# Patient Record
Sex: FEMALE | Race: BLACK OR AFRICAN AMERICAN | NOT HISPANIC OR LATINO | Employment: STUDENT | URBAN - METROPOLITAN AREA
[De-identification: names, ages, dates, MRNs, and addresses within clinical notes are randomized per-mention and may not be internally consistent; named-entity substitution may affect disease eponyms.]

---
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
## 2025-01-13 RX ORDER — OSELTAMIVIR PHOSPHATE 75 MG/1
75 CAPSULE ORAL 2 TIMES DAILY
Qty: 10 CAPSULE | Refills: 0 | Status: SHIPPED | OUTPATIENT
Start: 2025-01-13

## 2025-01-16 ENCOUNTER — APPOINTMENT (OUTPATIENT)
Dept: ULTRASOUND IMAGING | Facility: HOSPITAL | Age: 20
End: 2025-01-16
Payer: COMMERCIAL

## 2025-01-16 ENCOUNTER — HOSPITAL ENCOUNTER (EMERGENCY)
Facility: HOSPITAL | Age: 20
Discharge: HOME | End: 2025-01-16
Attending: EMERGENCY MEDICINE
Payer: COMMERCIAL

## 2025-01-16 VITALS
WEIGHT: 150.35 LBS | OXYGEN SATURATION: 98 % | BODY MASS INDEX: 24.16 KG/M2 | DIASTOLIC BLOOD PRESSURE: 76 MMHG | TEMPERATURE: 98.3 F | SYSTOLIC BLOOD PRESSURE: 131 MMHG | RESPIRATION RATE: 18 BRPM | HEIGHT: 66 IN | HEART RATE: 68 BPM

## 2025-01-16 DIAGNOSIS — R10.13 INTERMITTENT EPIGASTRIC ABDOMINAL PAIN: Primary | ICD-10-CM

## 2025-01-16 DIAGNOSIS — R74.8 ELEVATED LIVER ENZYMES: ICD-10-CM

## 2025-01-16 LAB
ALBUMIN SERPL-MCNC: 4.7 G/DL (ref 3.5–5.2)
ALBUMIN/GLOB SERPL: 1.3 G/DL
ALP SERPL-CCNC: 58 U/L (ref 39–117)
ALT SERPL W P-5'-P-CCNC: 94 U/L (ref 1–33)
ANION GAP SERPL CALCULATED.3IONS-SCNC: 12.1 MMOL/L (ref 5–15)
AST SERPL-CCNC: 114 U/L (ref 1–32)
BACTERIA UR QL AUTO: ABNORMAL /HPF
BASOPHILS # BLD AUTO: 0.01 10*3/MM3 (ref 0–0.2)
BASOPHILS NFR BLD AUTO: 0.2 % (ref 0–1.5)
BILIRUB SERPL-MCNC: 0.3 MG/DL (ref 0–1.2)
BILIRUB UR QL STRIP: ABNORMAL
BUN SERPL-MCNC: 7 MG/DL (ref 6–20)
BUN/CREAT SERPL: 8.6 (ref 7–25)
CALCIUM SPEC-SCNC: 9.5 MG/DL (ref 8.6–10.5)
CHLORIDE SERPL-SCNC: 101 MMOL/L (ref 98–107)
CLARITY UR: CLEAR
CO2 SERPL-SCNC: 26.9 MMOL/L (ref 22–29)
COLOR UR: YELLOW
CREAT SERPL-MCNC: 0.81 MG/DL (ref 0.57–1)
DEPRECATED RDW RBC AUTO: 39.5 FL (ref 37–54)
EGFRCR SERPLBLD CKD-EPI 2021: 107.4 ML/MIN/1.73
EOSINOPHIL # BLD AUTO: 0.01 10*3/MM3 (ref 0–0.4)
EOSINOPHIL NFR BLD AUTO: 0.2 % (ref 0.3–6.2)
ERYTHROCYTE [DISTWIDTH] IN BLOOD BY AUTOMATED COUNT: 13.2 % (ref 12.3–15.4)
GLOBULIN UR ELPH-MCNC: 3.7 GM/DL
GLUCOSE SERPL-MCNC: 91 MG/DL (ref 65–99)
GLUCOSE UR STRIP-MCNC: NEGATIVE MG/DL
HCG INTACT+B SERPL-ACNC: <0.5 MIU/ML
HCT VFR BLD AUTO: 49.3 % (ref 34–46.6)
HGB BLD-MCNC: 15.6 G/DL (ref 12–15.9)
HGB UR QL STRIP.AUTO: NEGATIVE
HOLD SPECIMEN: NORMAL
HOLD SPECIMEN: NORMAL
HYALINE CASTS UR QL AUTO: ABNORMAL /LPF
IMM GRANULOCYTES # BLD AUTO: 0.01 10*3/MM3 (ref 0–0.05)
IMM GRANULOCYTES NFR BLD AUTO: 0.2 % (ref 0–0.5)
KETONES UR QL STRIP: ABNORMAL
LEUKOCYTE ESTERASE UR QL STRIP.AUTO: NEGATIVE
LIPASE SERPL-CCNC: 35 U/L (ref 13–60)
LYMPHOCYTES # BLD AUTO: 1.29 10*3/MM3 (ref 0.7–3.1)
LYMPHOCYTES NFR BLD AUTO: 28.6 % (ref 19.6–45.3)
MCH RBC QN AUTO: 26.1 PG (ref 26.6–33)
MCHC RBC AUTO-ENTMCNC: 31.6 G/DL (ref 31.5–35.7)
MCV RBC AUTO: 82.4 FL (ref 79–97)
MONOCYTES # BLD AUTO: 0.7 10*3/MM3 (ref 0.1–0.9)
MONOCYTES NFR BLD AUTO: 15.5 % (ref 5–12)
MUCOUS THREADS URNS QL MICRO: ABNORMAL /HPF
NEUTROPHILS NFR BLD AUTO: 2.49 10*3/MM3 (ref 1.7–7)
NEUTROPHILS NFR BLD AUTO: 55.3 % (ref 42.7–76)
NITRITE UR QL STRIP: NEGATIVE
NRBC BLD AUTO-RTO: 0 /100 WBC (ref 0–0.2)
PH UR STRIP.AUTO: 6.5 [PH] (ref 5–8)
PLATELET # BLD AUTO: 203 10*3/MM3 (ref 140–450)
PMV BLD AUTO: 12.5 FL (ref 6–12)
POTASSIUM SERPL-SCNC: 3.6 MMOL/L (ref 3.5–5.2)
PROT SERPL-MCNC: 8.4 G/DL (ref 6–8.5)
PROT UR QL STRIP: ABNORMAL
RBC # BLD AUTO: 5.98 10*6/MM3 (ref 3.77–5.28)
RBC # UR STRIP: ABNORMAL /HPF
REF LAB TEST METHOD: ABNORMAL
SODIUM SERPL-SCNC: 140 MMOL/L (ref 136–145)
SP GR UR STRIP: 1.02 (ref 1–1.03)
SQUAMOUS #/AREA URNS HPF: ABNORMAL /HPF
UROBILINOGEN UR QL STRIP: ABNORMAL
WBC # UR STRIP: ABNORMAL /HPF
WBC NRBC COR # BLD AUTO: 4.51 10*3/MM3 (ref 3.4–10.8)
WHOLE BLOOD HOLD COAG: NORMAL
WHOLE BLOOD HOLD SPECIMEN: NORMAL

## 2025-01-16 PROCEDURE — 81001 URINALYSIS AUTO W/SCOPE: CPT | Performed by: EMERGENCY MEDICINE

## 2025-01-16 PROCEDURE — 84702 CHORIONIC GONADOTROPIN TEST: CPT | Performed by: EMERGENCY MEDICINE

## 2025-01-16 PROCEDURE — 83690 ASSAY OF LIPASE: CPT | Performed by: EMERGENCY MEDICINE

## 2025-01-16 PROCEDURE — 99284 EMERGENCY DEPT VISIT MOD MDM: CPT

## 2025-01-16 PROCEDURE — 80053 COMPREHEN METABOLIC PANEL: CPT | Performed by: EMERGENCY MEDICINE

## 2025-01-16 PROCEDURE — 76705 ECHO EXAM OF ABDOMEN: CPT

## 2025-01-16 PROCEDURE — 85025 COMPLETE CBC W/AUTO DIFF WBC: CPT | Performed by: EMERGENCY MEDICINE

## 2025-01-16 RX ORDER — DICYCLOMINE HCL 20 MG
20 TABLET ORAL EVERY 6 HOURS
Qty: 20 TABLET | Refills: 0 | Status: SHIPPED | OUTPATIENT
Start: 2025-01-16

## 2025-01-16 RX ORDER — PANTOPRAZOLE SODIUM 20 MG/1
40 TABLET, DELAYED RELEASE ORAL DAILY
Qty: 30 TABLET | Refills: 0 | Status: SHIPPED | OUTPATIENT
Start: 2025-01-16

## 2025-01-16 RX ORDER — SODIUM CHLORIDE 0.9 % (FLUSH) 0.9 %
10 SYRINGE (ML) INJECTION AS NEEDED
Status: DISCONTINUED | OUTPATIENT
Start: 2025-01-16 | End: 2025-01-16 | Stop reason: HOSPADM

## 2025-01-16 NOTE — ED PROVIDER NOTES
Time: 10:41 AM EST  Date of encounter:  1/16/2025  Independent Historian/Clinical History and Information was obtained by:   Patient    History is limited by: N/A    Chief Complaint: Abdominal pain      History of Present Illness:  Patient is a 19 y.o. year old female who presents to the emergency department for evaluation of abdominal pain.  Patient presents the emergency department today for evaluation of epigastric abdominal pain that has been intermittently going on since 12-.  She has associated nausea and vomiting.  She does state that she has had some loose stools but no significant diarrhea.  She denies fever, dysuria, urinary frequency.  Patient denies any previous abdominal surgeries.  She did state that she had an increase of taking NSAIDs once her pain started and was not taking this medication with food however she has quit taking this.  Patient does not drink alcohol.  Patient states that her symptoms seem to be worse after eating food.      Patient Care Team  Primary Care Provider: Ismael Lai DO    Past Medical History:     No Known Allergies  History reviewed. No pertinent past medical history.  History reviewed. No pertinent surgical history.  History reviewed. No pertinent family history.    Home Medications:  Prior to Admission medications    Medication Sig Start Date End Date Taking? Authorizing Provider   oseltamivir (Tamiflu) 75 MG capsule Take 1 capsule by mouth 2 (Two) Times a Day. 1/13/25  Yes Ismael Lai DO   norethindrone-ethinyl estradiol-ferrous fumarate (LOESTIN 24 FE) 1-20 MG-MCG(24) per tablet Take 1 tablet by mouth Daily for 90 days. 7/1/24 9/29/24  Ismael Lai DO        Social History:   Social History     Tobacco Use    Smoking status: Never     Passive exposure: Never    Smokeless tobacco: Never   Vaping Use    Vaping status: Never Used   Substance Use Topics    Alcohol use: Never    Drug use: Never         Review of Systems:  Review  "of Systems   Constitutional:  Negative for fever.   Respiratory:  Negative for shortness of breath.    Cardiovascular:  Negative for chest pain.   Gastrointestinal:  Positive for abdominal pain, nausea and vomiting. Negative for diarrhea.   Genitourinary:  Negative for dysuria and frequency.   Musculoskeletal:  Negative for back pain.        Physical Exam:  /76 (BP Location: Left arm, Patient Position: Sitting)   Pulse 68   Temp 98.3 °F (36.8 °C) (Oral)   Resp 18   Ht 167.6 cm (66\")   Wt 68.2 kg (150 lb 5.7 oz)   LMP 12/24/2024 (Exact Date)   SpO2 98%   BMI 24.27 kg/m²     Physical Exam  Vitals and nursing note reviewed.   Constitutional:       General: She is not in acute distress.     Appearance: Normal appearance. She is well-developed and normal weight. She is not ill-appearing, toxic-appearing or diaphoretic.   HENT:      Head: Normocephalic and atraumatic.      Nose: Nose normal.   Eyes:      Extraocular Movements: Extraocular movements intact.      Conjunctiva/sclera: Conjunctivae normal.      Pupils: Pupils are equal, round, and reactive to light.   Cardiovascular:      Rate and Rhythm: Normal rate and regular rhythm.      Heart sounds: Normal heart sounds.   Pulmonary:      Effort: Pulmonary effort is normal.      Breath sounds: Normal breath sounds.   Abdominal:      General: Abdomen is flat. Bowel sounds are normal.      Palpations: Abdomen is soft.      Tenderness: There is abdominal tenderness in the epigastric area. There is no guarding. Negative signs include Samuels's sign, Rovsing's sign and McBurney's sign.      Hernia: No hernia is present.   Musculoskeletal:         General: Normal range of motion.      Cervical back: Normal range of motion and neck supple.   Skin:     General: Skin is warm and dry.   Neurological:      General: No focal deficit present.      Mental Status: She is alert and oriented to person, place, and time.   Psychiatric:         Mood and Affect: Mood normal.    "      Behavior: Behavior normal.         Thought Content: Thought content normal.         Judgment: Judgment normal.                  Medical Decision Making:    Comorbidities that affect care:    None    External Notes reviewed:    None      The following orders were placed and all results were independently analyzed by me:  Orders Placed This Encounter   Procedures    US Gallbladder    [place] Draw    Comprehensive Metabolic Panel    Lipase    Urinalysis With Microscopic If Indicated (No Culture) - Urine, Clean Catch    hCG, Quantitative, Pregnancy    CBC Auto Differential    Urinalysis, Microscopic Only - Urine, Clean Catch    NPO Diet NPO Type: Strict NPO    Undress & Gown    Insert Peripheral IV    CBC & Differential    Green Top (Gel)    Lavender Top    Gold Top - SST    Light Blue Top       Medications Given in the Emergency Department:  Medications   sodium chloride 0.9 % flush 10 mL (has no administration in time range)        ED Course:    ED Course as of 01/16/25 1157   Thu Jan 16, 2025   1055 Patient currently denies any abdominal pain [MD]      ED Course User Index  [MD] Kaden Laird PA-C       Labs:    Lab Results (last 24 hours)       Procedure Component Value Units Date/Time    CBC & Differential [[id number]]  (Abnormal) Collected: 01/16/25 1027    Specimen: Blood Updated: 01/16/25 1037    Narrative:      The following orders were created for panel order CBC & Differential.  Procedure                               Abnormality         Status                     ---------                               -----------         ------                     CBC Auto Differential[[id number]]        Abnormal            Final result                 Please view results for these tests on the individual orders.    Comprehensive Metabolic Panel [[id number]]  (Abnormal) Collected: 01/16/25 1027    Specimen: Blood Updated: 01/16/25 1058     Glucose 91 mg/dL      BUN 7 mg/dL      Creatinine 0.81 mg/dL      Sodium 140  mmol/L      Potassium 3.6 mmol/L      Chloride 101 mmol/L      CO2 26.9 mmol/L      Calcium 9.5 mg/dL      Total Protein 8.4 g/dL      Albumin 4.7 g/dL      ALT (SGPT) 94 U/L      AST (SGOT) 114 U/L      Alkaline Phosphatase 58 U/L      Total Bilirubin 0.3 mg/dL      Globulin 3.7 gm/dL      A/G Ratio 1.3 g/dL      BUN/Creatinine Ratio 8.6     Anion Gap 12.1 mmol/L      eGFR 107.4 mL/min/1.73     Narrative:      GFR Categories in Chronic Kidney Disease (CKD)      GFR Category          GFR (mL/min/1.73)    Interpretation  G1                     90 or greater         Normal or high (1)  G2                      60-89                Mild decrease (1)  G3a                   45-59                Mild to moderate decrease  G3b                   30-44                Moderate to severe decrease  G4                    15-29                Severe decrease  G5                    14 or less           Kidney failure          (1)In the absence of evidence of kidney disease, neither GFR category G1 or G2 fulfill the criteria for CKD.    eGFR calculation 2021 CKD-EPI creatinine equation, which does not include race as a factor    Lipase [[id number]]  (Normal) Collected: 01/16/25 1027    Specimen: Blood Updated: 01/16/25 1058     Lipase 35 U/L     Urinalysis With Microscopic If Indicated (No Culture) - Urine, Clean Catch [[id number]]  (Abnormal) Collected: 01/16/25 1027    Specimen: Urine, Clean Catch Updated: 01/16/25 1043     Color, UA Yellow     Appearance, UA Clear     pH, UA 6.5     Specific Gravity, UA 1.020     Glucose, UA Negative     Ketones, UA Trace     Bilirubin, UA Small (1+)     Blood, UA Negative     Protein, UA 30 mg/dL (1+)     Leuk Esterase, UA Negative     Nitrite, UA Negative     Urobilinogen, UA 0.2 E.U./dL    hCG, Quantitative, Pregnancy [[id number]] Collected: 01/16/25 1027    Specimen: Blood Updated: 01/16/25 1056     HCG Quantitative <0.50 mIU/mL     Narrative:      HCG Ranges by Gestational Age    Females -  non-pregnant premenopausal   </= 1mIU/mL HCG  Females - postmenopausal               </= 7mIU/mL HCG    3 Weeks       5.4   -      72 mIU/mL  4 Weeks      10.2   -     708 mIU/mL  5 Weeks       217   -   8,245 mIU/mL  6 Weeks       152   -  32,177 mIU/mL  7 Weeks     4,059   - 153,767 mIU/mL  8 Weeks    31,366   - 149,094 mIU/mL  9 Weeks    59,109   - 135,901 mIU/mL  10 Weeks   44,186   - 170,409 mIU/mL  12 Weeks   27,107   - 201,615 mIU/mL  14 Weeks   24,302   -  93,646 mIU/mL  15 Weeks   12,540   -  69,747 mIU/mL  16 Weeks    8,904   -  55,332 mIU/mL  17 Weeks    8,240   -  51,793 mIU/mL  18 Weeks    9,649   -  55,271 mIU/mL      CBC Auto Differential [[id number]]  (Abnormal) Collected: 01/16/25 1027    Specimen: Blood Updated: 01/16/25 1037     WBC 4.51 10*3/mm3      RBC 5.98 10*6/mm3      Hemoglobin 15.6 g/dL      Hematocrit 49.3 %      MCV 82.4 fL      MCH 26.1 pg      MCHC 31.6 g/dL      RDW 13.2 %      RDW-SD 39.5 fl      MPV 12.5 fL      Platelets 203 10*3/mm3      Neutrophil % 55.3 %      Lymphocyte % 28.6 %      Monocyte % 15.5 %      Eosinophil % 0.2 %      Basophil % 0.2 %      Immature Grans % 0.2 %      Neutrophils, Absolute 2.49 10*3/mm3      Lymphocytes, Absolute 1.29 10*3/mm3      Monocytes, Absolute 0.70 10*3/mm3      Eosinophils, Absolute 0.01 10*3/mm3      Basophils, Absolute 0.01 10*3/mm3      Immature Grans, Absolute 0.01 10*3/mm3      nRBC 0.0 /100 WBC     Urinalysis, Microscopic Only - Urine, Clean Catch [[id number]]  (Abnormal) Collected: 01/16/25 1027    Specimen: Urine, Clean Catch Updated: 01/16/25 1050     RBC, UA None Seen /HPF      WBC, UA 3-5 /HPF      Bacteria, UA 1+ /HPF      Squamous Epithelial Cells, UA 0-2 /HPF      Hyaline Casts, UA 0-2 /LPF      Mucus, UA Moderate/2+ /HPF      Methodology Automated Microscopy             Imaging:    US Gallbladder    Result Date: 1/16/2025  US GALLBLADDER Date of Exam: 1/16/2025 11:01 AM EST Indication: Intermittent upper abdominal pain.  Comparison: No comparisons available. Technique: Grayscale and color Doppler ultrasound evaluation of the right upper quadrant was performed. Findings: Visualized pancreas unremarkable Liver demonstrates normal echogenicity and texture. No intrahepatic biliary duct dilatation. Hepatopetal flow in the main portal vein. Patent interrogated hepatic veins Gallbladder normal in size and wall thickness. No stones demonstrated. No pericholecystic or fluid. Common duct normal at 3 mm Right kidney demonstrates normal echogenicity with no hydronephrosis     Impression: Unremarkable right upper quadrant ultrasound Electronically Signed: Giuseppe Scanlon  1/16/2025 11:49 AM EST  Workstation ID: [id number]       Differential Diagnosis and Discussion:    Abdominal Pain: Based on the patient's signs and symptoms, I considered abdominal aortic aneurysm, small bowel obstruction, pancreatitis, acute cholecystitis, acute appendecitis, peptic ulcer disease, gastritis, colitis, endocrine disorders, irritable bowel syndrome and other differential diagnosis an etiology of the patient's abdominal pain.    PROCEDURES:    Labs were collected in the emergency department and all labs were reviewed and interpreted by me.  CT scan was performed in the emergency department and the CT scan radiology impression was interpreted by me.    No orders to display       Procedures    MDM  Number of Diagnoses or Management Options  Elevated liver enzymes  Intermittent epigastric abdominal pain  Diagnosis management comments: Patient presented to the emergency department today for evaluation of intermittent abdominal pain that is been ongoing for approximately 1 months.  CBC notes normal white blood cell count with hemoglobin of 15.6 and hematocrit 49.3.  CMP notes elevated ALT and AST which patient does not have history of.  hCG negative.  Lipase normal.  Urinalysis not significant for acute UTI.  Gallbladder ultrasound was completed and unremarkable, liver  also had unremarkable findings.  Will begin patient on Protonix and Bentyl at home and have her follow-up with PCP for reevaluation of labs.  Instructed patient if she continues to have issues she will need to follow-up with gastroenterology for EGD.  Return to the emergency department guidelines provided.       Amount and/or Complexity of Data Reviewed  Clinical lab tests: reviewed and ordered  Tests in the radiology section of CPT®: reviewed and ordered  Decide to obtain previous medical records or to obtain history from someone other than the patient: yes    Risk of Complications, Morbidity, and/or Mortality  Presenting problems: moderate  Diagnostic procedures: low  Management options: low    Patient Progress  Patient progress: stable         Patient Care Considerations:    CT ABDOMEN AND PELVIS: I considered ordering a CT scan of the abdomen and pelvis however patient has no current abdominal pain and symptoms are intermittent to the epigastric area      Consultants/Shared Management Plan:    None    Social Determinants of Health:    Patient is independent, reliable, and has access to care.       Disposition and Care Coordination:    Discharged: The patient is suitable and stable for discharge with no need for consideration of admission.    I have explained the patient´s condition, diagnoses and treatment plan based on the information available to me at this time. I have answered questions and addressed any concerns. The patient has a good  understanding of the patient´s diagnosis, condition, and treatment plan as can be expected at this point. The vital signs have been stable. The patient´s condition is stable and appropriate for discharge from the emergency department.      The patient will pursue further outpatient evaluation with the primary care physician or other designated or consulting physician as outlined in the discharge instructions. They are agreeable to this plan of care and follow-up instructions  have been explained in detail. The patient has received these instructions in written format and has expressed an understanding of the discharge instructions. The patient is aware that any significant change in condition or worsening of symptoms should prompt an immediate return to this or the closest emergency department or call to 911.  I have explained discharge medications and the need for follow up with the patient/caretakers. This was also printed in the discharge instructions. Patient was discharged with the following medications and follow up:      Medication List        New Prescriptions      dicyclomine 20 MG tablet  Commonly known as: BENTYL  Take 1 tablet by mouth Every 6 (Six) Hours.     pantoprazole 20 MG EC tablet  Commonly known as: PROTONIX  Take 2 tablets by mouth Daily.               Where to Get Your Medications        These medications were sent to [facility] [id number] - [place] - [address] AT [facility] [address] - [phone]  - [phone]   [address] [place] [address]      Phone: [phone]   dicyclomine 20 MG tablet  pantoprazole 20 MG EC tablet      Ismael Lai, DO  [address] [facility] [place] [address]  [phone]    Schedule an appointment as soon as possible for a visit          Final diagnoses:   Intermittent epigastric abdominal pain   Elevated liver enzymes        ED Disposition       ED Disposition   Discharge    Condition   Stable    Comment   --               This medical record created using voice recognition software.             Kaden Laird PA-C  01/16/25 [id number]

## 2025-01-16 NOTE — DISCHARGE INSTRUCTIONS
Your labs and ultrasound completed in the emergency department today look well other than your liver enzymes are mildly elevated.  Take Protonix daily.  Take Bentyl as needed for abdominal pain and cramping.  Schedule follow-up appointment with your primary care provider to have your liver enzymes reevaluated.  If you continue to have issues after beginning these medications you may need to see gastroenterologist to have an EGD completed.  Return to the emergency department for new or worsening symptoms.

## 2025-02-03 NOTE — PROGRESS NOTES
Chief Complaint  Annual Exam    Subjective          Diana Wade presents to [facility]  History of Present Illness    Previous PCP: Dr. Lai  Specialist(s): Petra Shetty (GYN)  COVID vaccine: Last dose 12/2021    History of Present Illness  The patient presents for evaluation of depression, anger issues, and HSV-2.    She reports a persistent state of unhappiness, characterized by frequent feelings of sadness outweighing moments of edward. She is uncertain if this constitutes depression. Her sleep pattern is normal, neither excessive nor insufficient. She has been experiencing these symptoms for an extended period but has not sought professional help until now. She occasionally experiences anxiety in public settings. Despite these challenges, she maintains her employment at [employer], although with occasional difficulties. She has no history of chronic medical conditions and is currently on birth control. She has never been on any medications for her symptoms. She attempted therapy through her employer's health program approximately 1 to 2 years ago but found the experience unsatisfactory due to a perceived lack of competence in the therapist. She is open to exploring medication as a potential treatment option.    She also reports a tendency towards irritability and anger, particularly in her work environment, which she manages without any specific coping strategies. Her family has observed this pattern of behavior for several years. She did not have any trouble in school when she was in school.    She was diagnosed with an STD last summer and requires medication for the same. She reported the incident to the police, but they were unable to take action due to lack of evidence as she waited for a week to report it. She tested positive for chlamydia in 07/2024 and was treated for it. 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 She has not had  "sexual contact with anyone since the incident and is not currently experiencing an outbreak.    She needs a refill on her birth control.    SOCIAL HISTORY  She works at [employer].    PHQ-2 Depression Screening  Little interest or pleasure in doing things? Several days   Feeling down, depressed, or hopeless? Over half   PHQ-2 Total Score 3         Current Outpatient Medications   Medication Instructions    norethindrone-ethinyl estradiol-ferrous fumarate (LOESTIN 24 FE) 1-20 MG-MCG(24) per tablet 1 tablet, Oral, Daily    sertraline (Zoloft) 50 MG tablet Take 1/2 tab PO QHS x 5 days then increase to 1 tab PO QHS    valACYclovir (VALTREX) 1,000 mg, Oral, 2 Times Daily       The following portions of the patient's history were reviewed and updated as appropriate: allergies, current medications, past family history, past medical history, past social history, past surgical history, and problem list.    Objective   Vital Signs:   /82 (BP Location: Left arm, Patient Position: Sitting, Cuff Size: Adult)   Pulse 110   Temp 98.2 °F (36.8 °C) (Temporal)   Ht 167.6 cm (66\")   Wt 68.9 kg (152 lb)   SpO2 96%   BMI 24.53 kg/m²     BP Readings from Last 3 Encounters:   02/06/25 118/82   01/31/25 133/84   01/16/25 131/76     Wt Readings from Last 3 Encounters:   02/06/25 68.9 kg (152 lb) (82%, Z= 0.90)*   01/31/25 68 kg (150 lb) (80%, Z= 0.84)*   01/16/25 68.2 kg (150 lb 5.7 oz) (80%, Z= 0.86)*     * Growth percentiles are based on CDC (Girls, 2-20 Years) data.     Pediatric BMI = 76 %ile (Z= 0.70) based on CDC (Girls, 2-20 Years) BMI-for-age based on BMI available on 2/6/2025.. BMI is within normal parameters. No other follow-up for BMI required.     Physical Exam     Appearance: No acute distress, well-nourished  Head: normocephalic, atraumatic  Eyes: extraocular movements intact, no scleral icterus, no conjunctival injection  Ears, Nose, and Throat: external ears normal, nares patent, moist mucous " membranes  Cardiovascular: regular rate and rhythm. no murmurs, rubs, or gallops. no edema  Respiratory: breathing comfortably, symmetric chest rise, clear to auscultation bilaterally. No wheezes, rales, or rhonchi.  Neuro: alert and oriented to time, place, and person. Normal gait  Psych: normal mood and affect     Physical Exam        Result Review :   The following data was reviewed by: TONY Tello on 02/06/2025:  Common labs          5/25/2024    15:13 1/16/2025    10:27 2/6/2025    11:49   Common Labs   Glucose 106  91  73    BUN 10  7  8    Creatinine 0.70  0.81  0.64    Sodium 139  140  141    Potassium 3.8  3.6  4.2    Chloride 103  101  107    Calcium 9.8  9.5  9.7    Albumin 4.4  4.7  4.2    Total Bilirubin 0.5  0.3  0.3    Alkaline Phosphatase 54  58  51    AST (SGOT) 20  114  27    ALT (SGPT) 11  94  19    WBC 8.59  4.51  6.40    Hemoglobin 13.6  15.6  13.1    Hematocrit 41.8  49.3  40.2    Platelets 270  203  322    Total Cholesterol   170    Triglycerides   44    HDL Cholesterol   57    LDL Cholesterol    104        Results  Laboratory Studies  Positive for chlamydia in July 2024. Positive for HSV-2.         Lab Results   Component Value Date    SARSANTIGEN Not Detected 05/04/2022    FLUAAG Not Detected 05/04/2022    FLUBAG Not Detected 05/04/2022    RAPSCRN Negative 05/04/2022    POCPREGUR Negative 02/06/2025    BILIRUBINUR Small (1+) (A) 01/16/2025            Assessment and Plan    Diagnoses and all orders for this visit:    1. Annual physical exam (Primary)  -     TSH Rfx On Abnormal To Free T4  -     Lipid Panel  -     Comprehensive Metabolic Panel  -     CBC & Differential    2. Screening for thyroid disorder  -     TSH Rfx On Abnormal To Free T4    3. Screening for lipid disorders  -     Lipid Panel    4. Need for hepatitis C screening test  -     HCV Antibody Rfx To Qnt PCR  -     Hepatitis C Virus Interpretation    5. OCP (oral contraceptive pills) initiation  Comments:  Start  pill today and take 2 today then one daily.  Orders:  -     norethindrone-ethinyl estradiol-ferrous fumarate (LOESTIN 24 FE) 1-20 MG-MCG(24) per tablet; Take 1 tablet by mouth Daily for 90 days.  Dispense: 90 tablet; Refill: 1  -     POCT pregnancy, urine    6. Current mild episode of major depressive disorder without prior episode  -     sertraline (Zoloft) 50 MG tablet; Take 1/2 tab PO QHS x 5 days then increase to 1 tab PO QHS  Dispense: 30 tablet; Refill: 1  -     Ambulatory Referral to Psychology    7. Outbursts of anger  -     sertraline (Zoloft) 50 MG tablet; Take 1/2 tab PO QHS x 5 days then increase to 1 tab PO QHS  Dispense: 30 tablet; Refill: 1  -     Ambulatory Referral to Psychology    8. STD exposure  -     RPR  -     HSV 1 and 2-Specific Ab, IgG  -     HIV-1/O/2 ANTIGEN/ANTIBODY, 4TH GENERATION  -     Hepatitis panel, acute  -     Chlamydia trachomatis, Neisseria gonorrhoeae, Trichomonas vaginalis, PCR - Swab, Urine, Random Void    9. History of PCR DNA positive for HSV2  -     valACYclovir (Valtrex) 1000 MG tablet; Take 1 tablet by mouth 2 (Two) Times a Day for 7 days.  Dispense: 14 tablet; Refill: 0      Initiate zoloft     Initiate valtrex    Assessment & Plan  1. Depression.  She reports feeling more sad than happy most days and has been dealing with these symptoms for a while. Zoloft (sertraline) will be initiated at a dosage of 50 mg, with half a tablet to be taken nightly for the first 5 days, followed by a full tablet thereafter. Potential side effects, including mild sedation, have been discussed. It may take up to 4 weeks to reach its maximum effect. A referral to a behavioral health clinic has been made for further evaluation and counseling. If any side effects occur, she is advised to contact the office before the month is up.    2. Anger issues.  She experiences issues with getting angry very quickly, especially at work. Zoloft (sertraline) is expected to help manage these symptoms as  well. The importance of finding a suitable therapist has been emphasized, and a referral to a behavioral health clinic has been made.    3. HSV-2.  She tested positive for HSV-2 and has had an outbreak. A prescription for antiviral medication has been provided, with instructions to take it for 7 days during an outbreak. If another outbreak occurs, she should notify the office so the dosage can be adjusted. A comprehensive STD panel will be conducted today, including tests for hepatitis, HIV, syphilis, gonorrhea, and chlamydia.    4. Medication management.  A urine pregnancy test will be performed today to facilitate the renewal of her birth control prescription.    5. Health maintenance.  Blood work will be conducted today to assess cholesterol, kidney, liver, and thyroid function.    Follow-up  The patient will follow up in 4 weeks.    Medications Discontinued During This Encounter   Medication Reason    norethindrone-ethinyl estradiol-ferrous fumarate (LOESTIN 24 FE) 1-20 MG-MCG(24) per tablet Reorder          Follow Up   Return in about 4 weeks (around 3/6/2025) for Anxiety, Depression.  Patient was given instructions and counseling regarding her condition or for health maintenance advice. Please see specific information pulled into the AVS if appropriate.       TONY Tello  02/14/25  13:04 EST      Patient or patient representative verbalized consent for the use of Ambient Listening during the visit with  TONY Tello for chart documentation. 2/14/2025  13:04 EST

## 2025-02-06 ENCOUNTER — OFFICE VISIT (OUTPATIENT)
Dept: INTERNAL MEDICINE | Facility: CLINIC | Age: 20
End: 2025-02-06
Payer: COMMERCIAL

## 2025-02-06 VITALS
HEART RATE: 110 BPM | SYSTOLIC BLOOD PRESSURE: 118 MMHG | TEMPERATURE: 98.2 F | BODY MASS INDEX: 24.43 KG/M2 | OXYGEN SATURATION: 96 % | WEIGHT: 152 LBS | DIASTOLIC BLOOD PRESSURE: 82 MMHG | HEIGHT: 66 IN

## 2025-02-06 DIAGNOSIS — Z13.29 SCREENING FOR THYROID DISORDER: ICD-10-CM

## 2025-02-06 DIAGNOSIS — Z13.220 SCREENING FOR LIPID DISORDERS: ICD-10-CM

## 2025-02-06 DIAGNOSIS — Z20.2 STD EXPOSURE: ICD-10-CM

## 2025-02-06 DIAGNOSIS — F32.0 CURRENT MILD EPISODE OF MAJOR DEPRESSIVE DISORDER WITHOUT PRIOR EPISODE: ICD-10-CM

## 2025-02-06 DIAGNOSIS — Z86.19 HISTORY OF PCR DNA POSITIVE FOR HSV2: ICD-10-CM

## 2025-02-06 DIAGNOSIS — Z30.011 OCP (ORAL CONTRACEPTIVE PILLS) INITIATION: ICD-10-CM

## 2025-02-06 DIAGNOSIS — Z00.00 ANNUAL PHYSICAL EXAM: Primary | ICD-10-CM

## 2025-02-06 DIAGNOSIS — R45.4 OUTBURSTS OF ANGER: ICD-10-CM

## 2025-02-06 DIAGNOSIS — Z11.59 NEED FOR HEPATITIS C SCREENING TEST: ICD-10-CM

## 2025-02-06 LAB
ALBUMIN SERPL-MCNC: 4.2 G/DL (ref 3.5–5.2)
ALBUMIN/GLOB SERPL: 1.3 G/DL
ALP SERPL-CCNC: 51 U/L (ref 39–117)
ALT SERPL W P-5'-P-CCNC: 19 U/L (ref 1–33)
ANION GAP SERPL CALCULATED.3IONS-SCNC: 10.6 MMOL/L (ref 5–15)
AST SERPL-CCNC: 27 U/L (ref 1–32)
B-HCG UR QL: NEGATIVE
BASOPHILS # BLD AUTO: 0.04 10*3/MM3 (ref 0–0.2)
BASOPHILS NFR BLD AUTO: 0.6 % (ref 0–1.5)
BILIRUB SERPL-MCNC: 0.3 MG/DL (ref 0–1.2)
BUN SERPL-MCNC: 8 MG/DL (ref 6–20)
BUN/CREAT SERPL: 12.5 (ref 7–25)
CALCIUM SPEC-SCNC: 9.7 MG/DL (ref 8.6–10.5)
CHLORIDE SERPL-SCNC: 107 MMOL/L (ref 98–107)
CHOLEST SERPL-MCNC: 170 MG/DL (ref 0–200)
CO2 SERPL-SCNC: 23.4 MMOL/L (ref 22–29)
CREAT SERPL-MCNC: 0.64 MG/DL (ref 0.57–1)
DEPRECATED RDW RBC AUTO: 38.1 FL (ref 37–54)
EGFRCR SERPLBLD CKD-EPI 2021: 130.7 ML/MIN/1.73
EOSINOPHIL # BLD AUTO: 0.04 10*3/MM3 (ref 0–0.4)
EOSINOPHIL NFR BLD AUTO: 0.6 % (ref 0.3–6.2)
ERYTHROCYTE [DISTWIDTH] IN BLOOD BY AUTOMATED COUNT: 12.7 % (ref 12.3–15.4)
EXPIRATION DATE: NORMAL
GLOBULIN UR ELPH-MCNC: 3.2 GM/DL
GLUCOSE SERPL-MCNC: 73 MG/DL (ref 65–99)
HAV IGM SERPL QL IA: NORMAL
HBV CORE IGM SERPL QL IA: NORMAL
HBV SURFACE AG SERPL QL IA: NORMAL
HCT VFR BLD AUTO: 40.2 % (ref 34–46.6)
HCV AB SER QL: NORMAL
HDLC SERPL-MCNC: 57 MG/DL (ref 40–60)
HGB BLD-MCNC: 13.1 G/DL (ref 12–15.9)
HIV 1+2 AB+HIV1 P24 AG SERPL QL IA: NORMAL
IMM GRANULOCYTES # BLD AUTO: 0.01 10*3/MM3 (ref 0–0.05)
IMM GRANULOCYTES NFR BLD AUTO: 0.2 % (ref 0–0.5)
INTERNAL NEGATIVE CONTROL: NORMAL
INTERNAL POSITIVE CONTROL: NORMAL
LDLC SERPL CALC-MCNC: 104 MG/DL (ref 0–100)
LDLC/HDLC SERPL: 1.83 {RATIO}
LYMPHOCYTES # BLD AUTO: 1.39 10*3/MM3 (ref 0.7–3.1)
LYMPHOCYTES NFR BLD AUTO: 21.7 % (ref 19.6–45.3)
Lab: NORMAL
MCH RBC QN AUTO: 27.2 PG (ref 26.6–33)
MCHC RBC AUTO-ENTMCNC: 32.6 G/DL (ref 31.5–35.7)
MCV RBC AUTO: 83.4 FL (ref 79–97)
MONOCYTES # BLD AUTO: 0.58 10*3/MM3 (ref 0.1–0.9)
MONOCYTES NFR BLD AUTO: 9.1 % (ref 5–12)
NEUTROPHILS NFR BLD AUTO: 4.34 10*3/MM3 (ref 1.7–7)
NEUTROPHILS NFR BLD AUTO: 67.8 % (ref 42.7–76)
NRBC BLD AUTO-RTO: 0 /100 WBC (ref 0–0.2)
PLATELET # BLD AUTO: 322 10*3/MM3 (ref 140–450)
PMV BLD AUTO: 12.4 FL (ref 6–12)
POTASSIUM SERPL-SCNC: 4.2 MMOL/L (ref 3.5–5.2)
PROT SERPL-MCNC: 7.4 G/DL (ref 6–8.5)
RBC # BLD AUTO: 4.82 10*6/MM3 (ref 3.77–5.28)
SODIUM SERPL-SCNC: 141 MMOL/L (ref 136–145)
TRIGL SERPL-MCNC: 44 MG/DL (ref 0–150)
TSH SERPL DL<=0.05 MIU/L-ACNC: 0.85 UIU/ML (ref 0.27–4.2)
VLDLC SERPL-MCNC: 9 MG/DL (ref 5–40)
WBC NRBC COR # BLD AUTO: 6.4 10*3/MM3 (ref 3.4–10.8)

## 2025-02-06 PROCEDURE — 87591 N.GONORRHOEAE DNA AMP PROB: CPT | Performed by: NURSE PRACTITIONER

## 2025-02-06 PROCEDURE — 80074 ACUTE HEPATITIS PANEL: CPT | Performed by: NURSE PRACTITIONER

## 2025-02-06 PROCEDURE — 81025 URINE PREGNANCY TEST: CPT | Performed by: NURSE PRACTITIONER

## 2025-02-06 PROCEDURE — 85025 COMPLETE CBC W/AUTO DIFF WBC: CPT | Performed by: NURSE PRACTITIONER

## 2025-02-06 PROCEDURE — 80053 COMPREHEN METABOLIC PANEL: CPT | Performed by: NURSE PRACTITIONER

## 2025-02-06 PROCEDURE — 87661 TRICHOMONAS VAGINALIS AMPLIF: CPT | Performed by: NURSE PRACTITIONER

## 2025-02-06 PROCEDURE — 80061 LIPID PANEL: CPT | Performed by: NURSE PRACTITIONER

## 2025-02-06 PROCEDURE — 86803 HEPATITIS C AB TEST: CPT | Performed by: NURSE PRACTITIONER

## 2025-02-06 PROCEDURE — 86592 SYPHILIS TEST NON-TREP QUAL: CPT | Performed by: NURSE PRACTITIONER

## 2025-02-06 PROCEDURE — 99214 OFFICE O/P EST MOD 30 MIN: CPT | Performed by: NURSE PRACTITIONER

## 2025-02-06 PROCEDURE — 84443 ASSAY THYROID STIM HORMONE: CPT | Performed by: NURSE PRACTITIONER

## 2025-02-06 PROCEDURE — 86696 HERPES SIMPLEX TYPE 2 TEST: CPT | Performed by: NURSE PRACTITIONER

## 2025-02-06 PROCEDURE — 87491 CHLMYD TRACH DNA AMP PROBE: CPT | Performed by: NURSE PRACTITIONER

## 2025-02-06 PROCEDURE — 86695 HERPES SIMPLEX TYPE 1 TEST: CPT | Performed by: NURSE PRACTITIONER

## 2025-02-06 PROCEDURE — G0432 EIA HIV-1/HIV-2 SCREEN: HCPCS | Performed by: NURSE PRACTITIONER

## 2025-02-07 ENCOUNTER — TELEPHONE (OUTPATIENT)
Age: 20
End: 2025-02-07
Payer: COMMERCIAL

## 2025-02-07 LAB — RPR SER QL: NORMAL

## 2025-02-07 NOTE — TELEPHONE ENCOUNTER
Spoke to patient to see if she would like to come in sooner because she was on the waitlist. Patient does not want too. She said she wants to keep her appointment for 2/19/2025. Removing from waitlist per patient.

## 2025-02-08 LAB
HCV AB SERPL QL IA: NORMAL
HCV IGG SERPL QL IA: NON REACTIVE
HSV1 IGG SERPL QL IA: NON REACTIVE
HSV2 IGG SERPL QL IA: REACTIVE

## 2025-02-10 ENCOUNTER — PATIENT ROUNDING (BHMG ONLY) (OUTPATIENT)
Dept: INTERNAL MEDICINE | Facility: CLINIC | Age: 20
End: 2025-02-10
Payer: COMMERCIAL

## 2025-02-10 ENCOUNTER — TELEPHONE (OUTPATIENT)
Dept: INTERNAL MEDICINE | Facility: CLINIC | Age: 20
End: 2025-02-10
Payer: COMMERCIAL

## 2025-02-10 RX ORDER — VALACYCLOVIR HYDROCHLORIDE 1 G/1
1000 TABLET, FILM COATED ORAL 2 TIMES DAILY
Qty: 14 TABLET | Refills: 0 | Status: SHIPPED | OUTPATIENT
Start: 2025-02-10 | End: 2025-02-17

## 2025-02-10 NOTE — PROGRESS NOTES
February 10, 2025    Hello, may I speak with Diana Wade?    My name is Cecile      I am  with [facility]  [address]   [place] [address] [phone].    Before we get started may I verify your date of birth? 2005    I am calling to officially welcome you to our practice and ask about your recent visit. Is this a good time to talk? My chart message sent for patient rounding.

## 2025-02-10 NOTE — TELEPHONE ENCOUNTER
Caller: Cesario     Relationship: Mother     Best call back number: [phone]      Who are you requesting to speak with (clinical staff, provider,  specific staff member): Airam and MA     What was the call regarding: Parent called stating her daughter was seen on Thursday as a New Patient and has not received her prescriptions for birth control, depression, Aciclovir.    Requesting call back on prescription are placed or if there are any further questions

## 2025-02-10 NOTE — TELEPHONE ENCOUNTER
"Attempted to contact patient. Left message to call the office back    Relay   HUB ok to read/advise  \"Medications have been sent to the pharmacy.\"          "

## 2025-02-11 ENCOUNTER — TELEPHONE (OUTPATIENT)
Dept: INTERNAL MEDICINE | Facility: CLINIC | Age: 20
End: 2025-02-11
Payer: COMMERCIAL

## 2025-02-11 ENCOUNTER — TELEPHONE (OUTPATIENT)
Dept: INTERNAL MEDICINE | Facility: CLINIC | Age: 20
End: 2025-02-11

## 2025-02-11 LAB
C TRACH RRNA SPEC QL NAA+PROBE: NEGATIVE
N GONORRHOEA RRNA SPEC QL NAA+PROBE: NEGATIVE
T VAGINALIS RRNA SPEC QL NAA+PROBE: NEGATIVE

## 2025-02-11 NOTE — TELEPHONE ENCOUNTER
"Attempted to contact patient. Left message to call the office back.    Relay   HUB ok to read/advise  RESULTS MAY ONLY BE GIVEN TO THE PATIENT   \"+ HSV 2.      Other labs reassuring\"        "

## 2025-02-11 NOTE — TELEPHONE ENCOUNTER
Caller: Diana Wade    Relationship to patient: Self    Best call back number: [phone]    Patient is needing: PATIENT CALLED IN AND IS REQUESTING A CALL BACK REGARDING LAB RESULTS AND SAID THAT IT IS OKAY TO LEAVE MESSAGE ON PHONE.

## 2025-02-11 NOTE — TELEPHONE ENCOUNTER
----- Message from Airam Alejandra sent at 2/11/2025  9:40 AM EST -----  + HSV 2.     Other labs reassuring.

## 2025-02-12 NOTE — TELEPHONE ENCOUNTER
Spoke with patient. Verified .   Made aware of results.  Verbalized understanding. Patient had no further questions.  Patient also confirmed she received the prescriptions sent the other day.

## 2025-02-18 ENCOUNTER — TELEPHONE (OUTPATIENT)
Age: 20
End: 2025-02-18

## 2025-02-18 NOTE — TELEPHONE ENCOUNTER
Caller: MILEY ELIAS     Best call back number: [phone]    PATIENT CALLED REQUESTING TO CANCEL SAME DAY APPT.    Did the patient call AFTER the start time of their scheduled appointment?  []YES  [x]NO    Was the patient's appointment rescheduled? [x]YES  []NO

## 2025-03-03 NOTE — PROGRESS NOTES
"Chief Complaint  Depression    Subjective          Diana Wade presents to [facility]  Depression  Presents for follow-up visit. Symptoms include decreased concentration, depressed mood, excessive worry, irritability, nervousness/anxiety, malaise/fatigue and difficulty controlling mood. Patient is not experiencing: compulsions, confusion, dry mouth, feelings of hopelessness, feelings of worthlessness, hypersomnia, hyperventilation, impotence, insomnia, muscle tension, obsessions, palpitations, panic, psychomotor agitation, psychomotor retardation, shortness of breath, suicidal ideas, suicidal planning, thoughts of death, weight gain, weight loss, chest pain, feeling of choking, dizziness and nausea.  Her past medical history is significant for depression.       History of Present Illness      Current Outpatient Medications   Medication Instructions    norethindrone-ethinyl estradiol-ferrous fumarate (LOESTIN 24 FE) 1-20 MG-MCG(24) per tablet 1 tablet, Oral, Daily    Rexulti 0.25 mg, Oral, Daily    sertraline (ZOLOFT) 100 mg, Oral, Daily    valACYclovir (VALTREX) 1,000 mg, Oral, 2 Times Daily       The following portions of the patient's history were reviewed and updated as appropriate: allergies, current medications, past family history, past medical history, past social history, past surgical history, and problem list.    Objective   Vital Signs:   /75 (BP Location: Left arm, Patient Position: Sitting, Cuff Size: Adult)   Pulse 69   Temp 97 °F (36.1 °C) (Temporal)   Ht 167.6 cm (66\")   Wt 66.5 kg (146 lb 9.6 oz)   SpO2 97%   BMI 23.66 kg/m²     BP Readings from Last 3 Encounters:   03/07/25 115/75   02/06/25 118/82   01/31/25 133/84     Wt Readings from Last 3 Encounters:   03/07/25 66.5 kg (146 lb 9.6 oz) (77%, Z= 0.73)*   02/06/25 68.9 kg (152 lb) (82%, Z= 0.90)*   01/31/25 68 kg (150 lb) (80%, Z= 0.84)*     * Growth percentiles are based on CDC " (Girls, 2-20 Years) data.     Pediatric BMI = 70 %ile (Z= 0.51) based on CDC (Girls, 2-20 Years) BMI-for-age based on BMI available on 3/7/2025.. BMI is within normal parameters. No other follow-up for BMI required.     Physical Exam     Appearance: No acute distress, well-nourished  Head: normocephalic, atraumatic  Eyes: extraocular movements intact, no scleral icterus, no conjunctival injection  Ears, Nose, and Throat: external ears normal, nares patent, moist mucous membranes  Cardiovascular: regular rate and rhythm. no murmurs, rubs, or gallops. no edema  Respiratory: breathing comfortably, symmetric chest rise, clear to auscultation bilaterally. No wheezes, rales, or rhonchi.  Neuro: alert and oriented to time, place, and person. Normal gait  Psych: normal mood and affect     Physical Exam        Result Review :   The following data was reviewed by: TONY Tello on 03/07/2025:  Common labs          5/25/2024    15:13 1/16/2025    10:27 2/6/2025    11:49   Common Labs   Glucose 106  91  73    BUN 10  7  8    Creatinine 0.70  0.81  0.64    Sodium 139  140  141    Potassium 3.8  3.6  4.2    Chloride 103  101  107    Calcium 9.8  9.5  9.7    Albumin 4.4  4.7  4.2    Total Bilirubin 0.5  0.3  0.3    Alkaline Phosphatase 54  58  51    AST (SGOT) 20  114  27    ALT (SGPT) 11  94  19    WBC 8.59  4.51  6.40    Hemoglobin 13.6  15.6  13.1    Hematocrit 41.8  49.3  40.2    Platelets 270  203  322    Total Cholesterol   170    Triglycerides   44    HDL Cholesterol   57    LDL Cholesterol    104        Results           Lab Results   Component Value Date    SARSANTIGEN Not Detected 05/04/2022    FLUAAG Not Detected 05/04/2022    FLUBAG Not Detected 05/04/2022    RAPSCRN Negative 05/04/2022    POCPREGUR Negative 02/06/2025    BILIRUBINUR Small (1+) (A) 01/16/2025            Assessment and Plan    Diagnoses and all orders for this visit:    1. Current mild episode of major depressive disorder without prior  episode (Primary)  -     sertraline (Zoloft) 100 MG tablet; Take 1 tablet by mouth Daily.  Dispense: 30 tablet; Refill: 1  -     Brexpiprazole (Rexulti) 0.25 MG tablet; Take 0.25 mg by mouth Daily.  Dispense: 30 tablet; Refill: 1    2. History of PCR DNA positive for HSV2  -     valACYclovir (Valtrex) 1000 MG tablet; Take 1 tablet by mouth 2 (Two) Times a Day for 7 days.  Dispense: 14 tablet; Refill: 0      - insurance would not cover vraylar. Will add rexulti   - increasing zoloft to 100 mg   Assessment & Plan      Medications Discontinued During This Encounter   Medication Reason    sertraline (Zoloft) 50 MG tablet           Follow Up   Return in about 4 weeks (around 4/4/2025) for Anxiety, Depression.  Patient was given instructions and counseling regarding her condition or for health maintenance advice. Please see specific information pulled into the AVS if appropriate.       TONY Tello  03/07/25  12:42 EST      Patient or patient representative verbalized consent for the use of Ambient Listening during the visit with  TONY Tello for chart documentation. 3/7/2025  12:42 EST

## 2025-03-07 ENCOUNTER — OFFICE VISIT (OUTPATIENT)
Dept: INTERNAL MEDICINE | Facility: CLINIC | Age: 20
End: 2025-03-07
Payer: COMMERCIAL

## 2025-03-07 VITALS
SYSTOLIC BLOOD PRESSURE: 115 MMHG | HEIGHT: 66 IN | BODY MASS INDEX: 23.56 KG/M2 | DIASTOLIC BLOOD PRESSURE: 75 MMHG | TEMPERATURE: 97 F | HEART RATE: 69 BPM | OXYGEN SATURATION: 97 % | WEIGHT: 146.6 LBS

## 2025-03-07 DIAGNOSIS — Z86.19 HISTORY OF PCR DNA POSITIVE FOR HSV2: ICD-10-CM

## 2025-03-07 DIAGNOSIS — F32.0 CURRENT MILD EPISODE OF MAJOR DEPRESSIVE DISORDER WITHOUT PRIOR EPISODE: Primary | ICD-10-CM

## 2025-03-07 PROCEDURE — 99214 OFFICE O/P EST MOD 30 MIN: CPT | Performed by: NURSE PRACTITIONER

## 2025-03-07 RX ORDER — BREXPIPRAZOLE 0.25 MG/1
0.25 TABLET ORAL DAILY
Qty: 30 TABLET | Refills: 1 | Status: SHIPPED | OUTPATIENT
Start: 2025-03-07

## 2025-03-07 RX ORDER — SERTRALINE HYDROCHLORIDE 100 MG/1
100 TABLET, FILM COATED ORAL DAILY
Qty: 30 TABLET | Refills: 1 | Status: SHIPPED | OUTPATIENT
Start: 2025-03-07

## 2025-03-07 RX ORDER — VALACYCLOVIR HYDROCHLORIDE 1 G/1
1000 TABLET, FILM COATED ORAL 2 TIMES DAILY
Qty: 14 TABLET | Refills: 0 | Status: SHIPPED | OUTPATIENT
Start: 2025-03-07 | End: 2025-03-14

## 2025-03-14 ENCOUNTER — TELEPHONE (OUTPATIENT)
Dept: INTERNAL MEDICINE | Facility: CLINIC | Age: 20
End: 2025-03-14
Payer: COMMERCIAL

## 2025-03-14 NOTE — TELEPHONE ENCOUNTER
We never received notification from the  pharmacy or CoverMyMeds for the medication needing a PA.    PA for:  Brexpiprazole (Rexulti) 0.25 MG tablet (03/07/2025)     Sent to plan via CoverMyMeds   (Key: [id number])

## 2025-03-14 NOTE — TELEPHONE ENCOUNTER
Caller: [facility] [id number] - [place], [place] - [address] AT [facility] [address]  [phone] [facility] [phone] FX    Relationship to patient: Pharmacy      Best call back number: [phone] OPT[employer] 2 THAN 2     Provider: ROBERT URIOSTEGUI    Medication PA needed: REXULTI     Reason for call/Prior Auth: PHARMACY IS NEEDING A PRIOR AUTHORIZATION AND WANTS TO KNOW DID YA'LL RECEIVE THE ISABEL CODE.

## 2025-04-04 DIAGNOSIS — F32.0 CURRENT MILD EPISODE OF MAJOR DEPRESSIVE DISORDER WITHOUT PRIOR EPISODE: ICD-10-CM

## 2025-04-04 RX ORDER — SERTRALINE HYDROCHLORIDE 100 MG/1
100 TABLET, FILM COATED ORAL DAILY
Qty: 30 TABLET | Refills: 1 | Status: SHIPPED | OUTPATIENT
Start: 2025-04-04

## 2025-04-04 NOTE — PROGRESS NOTES
"Chief Complaint  Anxiety (4 week follow-up. Patient does not feel medications are working well. Requesting genesight testing. ) and Depression (4 week follow-up/)    Subjective          Diana Wade presents to [facility]  History of Present Illness    History of Present Illness      Depression/RANI  Presents for follow-up visit. Symptoms include decreased concentration, depressed mood, excessive worry, irritability, nervousness/anxiety, malaise/fatigue and difficulty controlling mood. Patient is not experiencing: compulsions, confusion, dry mouth, feelings of hopelessness, feelings of worthlessness, hypersomnia, hyperventilation, impotence, insomnia, muscle tension, obsessions, palpitations, panic, psychomotor agitation, psychomotor retardation, shortness of breath, suicidal ideas, suicidal planning, thoughts of death, weight gain, weight loss, chest pain, feeling of choking, dizziness and nausea.  Her past medical history is significant for depression.       Current Outpatient Medications   Medication Instructions    norethindrone-ethinyl estradiol-ferrous fumarate (LOESTIN 24 FE) 1-20 MG-MCG(24) per tablet 1 tablet, Oral, Daily    Rexulti 0.25 mg, Oral, Daily    sertraline (ZOLOFT) 50 mg, Oral, Daily       The following portions of the patient's history were reviewed and updated as appropriate: allergies, current medications, past family history, past medical history, past social history, past surgical history, and problem list.    Objective   Vital Signs:   /84 (BP Location: Left arm, Patient Position: Sitting, Cuff Size: Adult)   Pulse 81   Temp 97.9 °F (36.6 °C) (Temporal)   Ht 167.6 cm (66\")   Wt 70.8 kg (156 lb)   SpO2 97%   BMI 25.18 kg/m²     BP Readings from Last 3 Encounters:   04/11/25 132/84   03/07/25 115/75   02/06/25 118/82     Wt Readings from Last 3 Encounters:   04/11/25 70.8 kg (156 lb)   03/07/25 66.5 kg (146 lb 9.6 oz) (77%, Z= " 0.73)*   02/06/25 68.9 kg (152 lb) (82%, Z= 0.90)*     * Growth percentiles are based on [facility] (Girls, 2-20 Years) data.     BMI is within normal parameters. No other follow-up for BMI required.     Physical Exam     Appearance: No acute distress, well-nourished  Head: normocephalic, atraumatic  Eyes: extraocular movements intact, no scleral icterus, no conjunctival injection  Ears, Nose, and Throat: external ears normal, nares patent, moist mucous membranes  Cardiovascular: regular rate and rhythm. no murmurs, rubs, or gallops. no edema  Respiratory: breathing comfortably, symmetric chest rise, clear to auscultation bilaterally. No wheezes, rales, or rhonchi.  Neuro: alert and oriented to time, place, and person. Normal gait  Psych: normal mood and affect     Physical Exam        Result Review :   The following data was reviewed by: TONY Tello on 04/11/2025:  Common labs          5/25/2024    15:13 1/16/2025    10:27 2/6/2025    11:49   Common Labs   Glucose 106  91  73    BUN 10  7  8    Creatinine 0.70  0.81  0.64    Sodium 139  140  141    Potassium 3.8  3.6  4.2    Chloride 103  101  107    Calcium 9.8  9.5  9.7    Albumin 4.4  4.7  4.2    Total Bilirubin 0.5  0.3  0.3    Alkaline Phosphatase 54  58  51    AST (SGOT) 20  114  27    ALT (SGPT) 11  94  19    WBC 8.59  4.51  6.40    Hemoglobin 13.6  15.6  13.1    Hematocrit 41.8  49.3  40.2    Platelets 270  203  322    Total Cholesterol   170    Triglycerides   44    HDL Cholesterol   57    LDL Cholesterol    104        Results           Lab Results   Component Value Date    SARSANTIGEN Not Detected 05/04/2022    FLUAAG Not Detected 05/04/2022    FLUBAG Not Detected 05/04/2022    RAPSCRN Negative 05/04/2022    POCPREGUR Negative 02/06/2025    BILIRUBINUR Small (1+) (A) 01/16/2025            Assessment and Plan    Diagnoses and all orders for this visit:    1. Outbursts of anger (Primary)  -     GeneSight - Swab,; Future  -     sertraline (Zoloft) 100  MG tablet; Take 0.5 tablets by mouth Daily.    2. Current mild episode of major depressive disorder without prior episode  -     GeneSight - Swab,; Future  -     sertraline (Zoloft) 100 MG tablet; Take 0.5 tablets by mouth Daily.    3. RANI (generalized anxiety disorder)  -     sertraline (Zoloft) 100 MG tablet; Take 0.5 tablets by mouth Daily.      - decrease zoloft to 0.5 tabs daily (50 mg). Will switch medications based on genesight results.   Assessment & Plan      Medications Discontinued During This Encounter   Medication Reason    sertraline (Zoloft) 100 MG tablet Reorder          Follow Up   Return in about 6 weeks (around 5/23/2025) for Depression, Anxiety.  Patient was given instructions and counseling regarding her condition or for health maintenance advice. Please see specific information pulled into the AVS if appropriate.       TONY Tello  04/11/25  12:36 EDT      Patient or patient representative verbalized consent for the use of Ambient Listening during the visit with  TONY Tello for chart documentation. 4/11/2025  12:36 EDT

## 2025-04-11 ENCOUNTER — OFFICE VISIT (OUTPATIENT)
Dept: INTERNAL MEDICINE | Facility: CLINIC | Age: 20
End: 2025-04-11
Payer: COMMERCIAL

## 2025-04-11 VITALS
HEIGHT: 66 IN | DIASTOLIC BLOOD PRESSURE: 84 MMHG | SYSTOLIC BLOOD PRESSURE: 132 MMHG | WEIGHT: 156 LBS | BODY MASS INDEX: 25.07 KG/M2 | OXYGEN SATURATION: 97 % | TEMPERATURE: 97.9 F | HEART RATE: 81 BPM

## 2025-04-11 DIAGNOSIS — R45.4 OUTBURSTS OF ANGER: Primary | ICD-10-CM

## 2025-04-11 DIAGNOSIS — F32.0 CURRENT MILD EPISODE OF MAJOR DEPRESSIVE DISORDER WITHOUT PRIOR EPISODE: ICD-10-CM

## 2025-04-11 DIAGNOSIS — F41.1 GAD (GENERALIZED ANXIETY DISORDER): ICD-10-CM

## 2025-04-11 PROCEDURE — 99214 OFFICE O/P EST MOD 30 MIN: CPT | Performed by: NURSE PRACTITIONER

## 2025-04-11 RX ORDER — SERTRALINE HYDROCHLORIDE 100 MG/1
50 TABLET, FILM COATED ORAL DAILY
Start: 2025-04-11

## 2025-04-17 ENCOUNTER — TELEPHONE (OUTPATIENT)
Dept: INTERNAL MEDICINE | Facility: CLINIC | Age: 20
End: 2025-04-17
Payer: COMMERCIAL

## 2025-04-17 DIAGNOSIS — F32.0 CURRENT MILD EPISODE OF MAJOR DEPRESSIVE DISORDER WITHOUT PRIOR EPISODE: Primary | ICD-10-CM

## 2025-04-17 DIAGNOSIS — F41.1 GAD (GENERALIZED ANXIETY DISORDER): ICD-10-CM

## 2025-04-17 NOTE — TELEPHONE ENCOUNTER
"Attempted to contact patient. Left message to call the office back.      Relay   HUB ok to read/advise  \"[employer] report is back, Airam said patient can try one of the following medications from the list:  -Wellbutrin  -Pristiq  -Effexor  -Trintellix  -Cymbalta  If Patient would like to research or think about it prior to giving us an answer and then call back that is fine too.\"          "

## 2025-04-18 RX ORDER — DESVENLAFAXINE 50 MG/1
50 TABLET, FILM COATED, EXTENDED RELEASE ORAL DAILY
Qty: 30 TABLET | Refills: 1 | Status: SHIPPED | OUTPATIENT
Start: 2025-04-18

## 2025-04-18 NOTE — TELEPHONE ENCOUNTER
Spoke with patient. Transferred from front office.     Patient would like to start taking Pristiq.   Verified Pharmacy.  Medication pended.   Patient has follow-up scheduled for 05/23/2025.

## 2025-04-18 NOTE — TELEPHONE ENCOUNTER
"2nd attempt to contact patient. Left message to call the office back.        Relay   HUB ok to read/advise  \"[employer] report is back, Airam said patient can try one of the following medications from the list:  -Wellbutrin  -Pristiq  -Effexor  -Trintellix  -Cymbalta  If Patient would like to research or think about it prior to giving us an answer and then call back that is fine too.\"        "

## 2025-04-18 NOTE — TELEPHONE ENCOUNTER
Name: Diana Wade    Relationship: Self    HUB PROVIDED THE RELAY MESSAGE FROM THE OFFICE   PATIENT     ADDITIONAL INFORMATION: PATIENT WILL CALL BACK FOR MEDICATION ANSWER.

## 2025-04-30 DIAGNOSIS — F32.0 CURRENT MILD EPISODE OF MAJOR DEPRESSIVE DISORDER WITHOUT PRIOR EPISODE: ICD-10-CM

## 2025-04-30 RX ORDER — SERTRALINE HYDROCHLORIDE 100 MG/1
100 TABLET, FILM COATED ORAL DAILY
Qty: 30 TABLET | Refills: 1 | OUTPATIENT
Start: 2025-04-30

## 2025-05-03 DIAGNOSIS — F32.0 CURRENT MILD EPISODE OF MAJOR DEPRESSIVE DISORDER WITHOUT PRIOR EPISODE: ICD-10-CM

## 2025-05-05 RX ORDER — SERTRALINE HYDROCHLORIDE 100 MG/1
100 TABLET, FILM COATED ORAL DAILY
Qty: 30 TABLET | Refills: 1 | OUTPATIENT
Start: 2025-05-05

## 2025-05-09 RX ORDER — VALACYCLOVIR HYDROCHLORIDE 1 G/1
1000 TABLET, FILM COATED ORAL DAILY
Qty: 14 TABLET | Refills: 0 | Status: SHIPPED | OUTPATIENT
Start: 2025-05-09

## 2025-05-09 NOTE — TELEPHONE ENCOUNTER
Patient states she is having an outbreak and is needing valtrex sent in, I pended incase this is okay?

## 2025-05-12 DIAGNOSIS — F32.0 CURRENT MILD EPISODE OF MAJOR DEPRESSIVE DISORDER WITHOUT PRIOR EPISODE: ICD-10-CM

## 2025-05-12 RX ORDER — BREXPIPRAZOLE 0.25 MG/1
1 TABLET ORAL DAILY
Qty: 30 TABLET | Refills: 1 | Status: SHIPPED | OUTPATIENT
Start: 2025-05-12

## 2025-05-23 RX ORDER — VALACYCLOVIR HYDROCHLORIDE 1 G/1
1000 TABLET, FILM COATED ORAL DAILY
Qty: 14 TABLET | Refills: 0 | Status: SHIPPED | OUTPATIENT
Start: 2025-05-23

## 2025-05-23 NOTE — PROGRESS NOTES
Chief Complaint  Medication Problem (Wanting to come off of pristiq and rexalti, start something new. )    Subjective          Diana Wade presents to [facility]  History of Present Illness    History of Present Illness  The patient presents for evaluation of depression.    She expresses uncertainty regarding the efficacy of Pristiq in managing her symptoms and is contemplating a change in medication. Additionally, she raises concerns about potential weight gain associated with the use of Pristiq. She has previously been on Zoloft and Rexulti.     SOCIAL HISTORY  She works at [employer].    Depression/RANI  Presents for follow-up visit. Symptoms include decreased concentration, depressed mood, excessive worry, irritability, nervousness/anxiety, malaise/fatigue and difficulty controlling mood. Patient is not experiencing: compulsions, confusion, dry mouth, feelings of hopelessness, feelings of worthlessness, hypersomnia, hyperventilation, impotence, insomnia, muscle tension, obsessions, palpitations, panic, psychomotor agitation, psychomotor retardation, shortness of breath, suicidal ideas, suicidal planning, thoughts of death, weight gain, weight loss, chest pain, feeling of choking, dizziness and nausea.  Her past medical history is significant for depression.       Current Outpatient Medications   Medication Instructions    buPROPion SR (WELLBUTRIN SR) 150 mg, Oral, 2 Times Daily    norethindrone-ethinyl estradiol-ferrous fumarate (LOESTIN 24 FE) 1-20 MG-MCG(24) per tablet 1 tablet, Oral, Daily    valACYclovir (VALTREX) 1,000 mg, Oral, Daily       The following portions of the patient's history were reviewed and updated as appropriate: allergies, current medications, past family history, past medical history, past social history, past surgical history, and problem list.    Objective   Vital Signs:   /68 (BP Location: Left arm, Patient Position: Sitting,  "Cuff Size: Adult)   Pulse 88   Temp 97.6 °F (36.4 °C) (Temporal)   Ht 167.6 cm (66\")   Wt 77.7 kg (171 lb 6.4 oz)   SpO2 95%   BMI 27.66 kg/m²     BP Readings from Last 3 Encounters:   05/27/25 108/68   04/11/25 132/84   03/07/25 115/75     Wt Readings from Last 3 Encounters:   05/27/25 77.7 kg (171 lb 6.4 oz)   04/11/25 70.8 kg (156 lb)   03/07/25 66.5 kg (146 lb 9.6 oz) (77%, Z= 0.73)*     * Growth percentiles are based on CDC (Girls, 2-20 Years) data.     BMI is >= 25 and <30. (Overweight) The following options were offered after discussion;: weight loss educational material (shared in after visit summary), exercise counseling/recommendations, and nutrition counseling/recommendations     Physical Exam     Appearance: No acute distress, well-nourished  Head: normocephalic, atraumatic  Eyes: extraocular movements intact, no scleral icterus, no conjunctival injection  Ears, Nose, and Throat: external ears normal, nares patent, moist mucous membranes  Cardiovascular: regular rate and rhythm. no murmurs, rubs, or gallops. no edema  Respiratory: breathing comfortably, symmetric chest rise, clear to auscultation bilaterally. No wheezes, rales, or rhonchi.  Neuro: alert and oriented to time, place, and person. Normal gait  Psych: normal mood and affect     Physical Exam        Result Review :   The following data was reviewed by: TONY Tello on 05/27/2025:  Common labs          1/16/2025    10:27 2/6/2025    11:49   Common Labs   Glucose 91  73    BUN 7  8    Creatinine 0.81  0.64    Sodium 140  141    Potassium 3.6  4.2    Chloride 101  107    Calcium 9.5  9.7    Albumin 4.7  4.2    Total Bilirubin 0.3  0.3    Alkaline Phosphatase 58  51    AST (SGOT) 114  27    ALT (SGPT) 94  19    WBC 4.51  6.40    Hemoglobin 15.6  13.1    Hematocrit 49.3  40.2    Platelets 203  322    Total Cholesterol  170    Triglycerides  44    HDL Cholesterol  57    LDL Cholesterol   104        Results           Lab Results "   Component Value Date    SARSANTIGEN Not Detected 05/04/2022    FLUAAG Not Detected 05/04/2022    FLUBAG Not Detected 05/04/2022    RAPSCRN Negative 05/04/2022    POCPREGUR Negative 02/06/2025    BILIRUBINUR Small (1+) (A) 01/16/2025            Assessment and Plan    Diagnoses and all orders for this visit:    1. RANI (generalized anxiety disorder) (Primary)  -     buPROPion SR (Wellbutrin SR) 150 MG 12 hr tablet; Take 1 tablet by mouth 2 (Two) Times a Day.  Dispense: 60 tablet; Refill: 1    2. Outbursts of anger  -     buPROPion SR (Wellbutrin SR) 150 MG 12 hr tablet; Take 1 tablet by mouth 2 (Two) Times a Day.  Dispense: 60 tablet; Refill: 1    3. Current mild episode of major depressive disorder without prior episode  -     buPROPion SR (Wellbutrin SR) 150 MG 12 hr tablet; Take 1 tablet by mouth 2 (Two) Times a Day.  Dispense: 60 tablet; Refill: 1        Assessment & Plan  1. Depression.    - Discussed that it takes 4 to 6 weeks to notice a difference   - Wellbutrin will be initiated, which is also used for weight loss. Advised to take Wellbutrin twice daily as it lasts only 12 hours.  - Will discontinue Rexulti. Encouraged to call if there are any issues before the next appointment. Follow-up scheduled in 4 weeks.    Medications Discontinued During This Encounter   Medication Reason    desvenlafaxine (Pristiq) 50 MG 24 hr tablet *Therapy completed    Rexulti 0.25 MG tablet *Therapy completed          Follow Up   Return in about 4 weeks (around 6/24/2025) for Depression, Anxiety.  Patient was given instructions and counseling regarding her condition or for health maintenance advice. Please see specific information pulled into the AVS if appropriate.       TONY Tello  05/27/25  08:47 EDT      Patient or patient representative verbalized consent for the use of Ambient Listening during the visit with  TONY Tello for chart documentation. 5/27/2025  08:47 EDT

## 2025-05-27 ENCOUNTER — TELEPHONE (OUTPATIENT)
Dept: INTERNAL MEDICINE | Facility: CLINIC | Age: 20
End: 2025-05-27

## 2025-05-27 ENCOUNTER — OFFICE VISIT (OUTPATIENT)
Dept: INTERNAL MEDICINE | Facility: CLINIC | Age: 20
End: 2025-05-27
Payer: COMMERCIAL

## 2025-05-27 VITALS
WEIGHT: 171.4 LBS | HEART RATE: 88 BPM | BODY MASS INDEX: 27.55 KG/M2 | DIASTOLIC BLOOD PRESSURE: 68 MMHG | OXYGEN SATURATION: 95 % | SYSTOLIC BLOOD PRESSURE: 108 MMHG | HEIGHT: 66 IN | TEMPERATURE: 97.6 F

## 2025-05-27 DIAGNOSIS — F41.1 GAD (GENERALIZED ANXIETY DISORDER): Primary | ICD-10-CM

## 2025-05-27 DIAGNOSIS — F32.0 CURRENT MILD EPISODE OF MAJOR DEPRESSIVE DISORDER WITHOUT PRIOR EPISODE: ICD-10-CM

## 2025-05-27 DIAGNOSIS — R45.4 OUTBURSTS OF ANGER: ICD-10-CM

## 2025-05-27 PROCEDURE — 99214 OFFICE O/P EST MOD 30 MIN: CPT | Performed by: NURSE PRACTITIONER

## 2025-05-27 RX ORDER — BUPROPION HYDROCHLORIDE 150 MG/1
150 TABLET, EXTENDED RELEASE ORAL 2 TIMES DAILY
Qty: 60 TABLET | Refills: 1 | Status: SHIPPED | OUTPATIENT
Start: 2025-05-27

## 2025-05-27 RX ORDER — VALACYCLOVIR HYDROCHLORIDE 500 MG/1
500 TABLET, FILM COATED ORAL DAILY
Qty: 90 TABLET | Refills: 1 | Status: SHIPPED | OUTPATIENT
Start: 2025-05-27

## 2025-05-27 NOTE — TELEPHONE ENCOUNTER
For preventative it is 500 per day. I sent new script. If she has an outbreak we will need to increase to 1000 for 7 days

## 2025-05-27 NOTE — TELEPHONE ENCOUNTER
Caller: Diana Wade    Relationship to patient: Self    Best call back number: [phone]     Patient is needing: PATIENT HAD APPOINTMENT THIS MORNING AND FORGOT TO ASK PROVIDER IF SHE COULD CHANGE THE MEDICATION    valACYclovir (VALTREX) 1000 MG tablet   TO  BE TAKEN EVERY DAY INSTEAD OF AS NEEDED  PLEASE  ADVISE PATIENT IF THIS CAN BE DONE

## 2025-05-27 NOTE — TELEPHONE ENCOUNTER
HUB RELAYED MESSAGE. PATIENT VOICED UNDERSTANDING AND HAS NO FURTHER QUESTIONS.          Leanne Serrano MA AJ    5/27/25  2:03 PM  Note     Tried to return patient's call, left VM to call back.  HUB OKAY TO RELAY:   A new script for Valtrex has been sent in.

## 2025-05-27 NOTE — TELEPHONE ENCOUNTER
Tried to return patient's call, left VM to call back.  HUB OKAY TO RELAY:   A new script for Valtrex has been sent in.

## 2025-06-23 RX ORDER — VALACYCLOVIR HYDROCHLORIDE 500 MG/1
500 TABLET, FILM COATED ORAL DAILY
Qty: 90 TABLET | Refills: 1 | OUTPATIENT
Start: 2025-06-23

## 2025-07-02 DIAGNOSIS — F41.1 GAD (GENERALIZED ANXIETY DISORDER): ICD-10-CM

## 2025-07-02 DIAGNOSIS — R45.4 OUTBURSTS OF ANGER: ICD-10-CM

## 2025-07-02 DIAGNOSIS — F32.0 CURRENT MILD EPISODE OF MAJOR DEPRESSIVE DISORDER WITHOUT PRIOR EPISODE: ICD-10-CM

## 2025-07-02 RX ORDER — BUPROPION HYDROCHLORIDE 150 MG/1
150 TABLET, EXTENDED RELEASE ORAL 2 TIMES DAILY
Qty: 60 TABLET | Refills: 1 | Status: SHIPPED | OUTPATIENT
Start: 2025-07-02

## 2025-07-28 DIAGNOSIS — F32.0 CURRENT MILD EPISODE OF MAJOR DEPRESSIVE DISORDER WITHOUT PRIOR EPISODE: ICD-10-CM

## 2025-07-28 DIAGNOSIS — F41.1 GAD (GENERALIZED ANXIETY DISORDER): ICD-10-CM

## 2025-07-28 DIAGNOSIS — R45.4 OUTBURSTS OF ANGER: ICD-10-CM

## 2025-07-28 RX ORDER — BUPROPION HYDROCHLORIDE 150 MG/1
150 TABLET, EXTENDED RELEASE ORAL 2 TIMES DAILY
Qty: 60 TABLET | Refills: 1 | Status: SHIPPED | OUTPATIENT
Start: 2025-07-28

## 2025-08-04 DIAGNOSIS — F41.1 GAD (GENERALIZED ANXIETY DISORDER): ICD-10-CM

## 2025-08-04 DIAGNOSIS — F32.0 CURRENT MILD EPISODE OF MAJOR DEPRESSIVE DISORDER WITHOUT PRIOR EPISODE: ICD-10-CM

## 2025-08-04 DIAGNOSIS — R45.4 OUTBURSTS OF ANGER: ICD-10-CM

## 2025-08-04 RX ORDER — BUPROPION HYDROCHLORIDE 150 MG/1
150 TABLET, EXTENDED RELEASE ORAL 2 TIMES DAILY
Qty: 60 TABLET | Refills: 1 | OUTPATIENT
Start: 2025-08-04

## 2025-08-14 ENCOUNTER — OFFICE VISIT (OUTPATIENT)
Dept: INTERNAL MEDICINE | Facility: CLINIC | Age: 20
End: 2025-08-14
Payer: COMMERCIAL

## 2025-08-14 VITALS
SYSTOLIC BLOOD PRESSURE: 128 MMHG | HEIGHT: 66 IN | WEIGHT: 163 LBS | TEMPERATURE: 98 F | OXYGEN SATURATION: 98 % | HEART RATE: 98 BPM | DIASTOLIC BLOOD PRESSURE: 84 MMHG | BODY MASS INDEX: 26.2 KG/M2

## 2025-08-14 DIAGNOSIS — F32.0 CURRENT MILD EPISODE OF MAJOR DEPRESSIVE DISORDER WITHOUT PRIOR EPISODE: ICD-10-CM

## 2025-08-14 DIAGNOSIS — R45.4 OUTBURSTS OF ANGER: ICD-10-CM

## 2025-08-14 DIAGNOSIS — F41.1 GAD (GENERALIZED ANXIETY DISORDER): Primary | ICD-10-CM

## 2025-08-14 PROCEDURE — 99214 OFFICE O/P EST MOD 30 MIN: CPT | Performed by: NURSE PRACTITIONER

## 2025-08-14 RX ORDER — BREXPIPRAZOLE 0.25 MG/1
0.25 TABLET ORAL DAILY
Qty: 30 TABLET | Refills: 1 | Status: SHIPPED | OUTPATIENT
Start: 2025-08-14

## 2025-08-18 ENCOUNTER — TELEPHONE (OUTPATIENT)
Dept: INTERNAL MEDICINE | Facility: CLINIC | Age: 20
End: 2025-08-18
Payer: COMMERCIAL

## 2025-08-18 ENCOUNTER — APPOINTMENT (OUTPATIENT)
Dept: GENERAL RADIOLOGY | Facility: HOSPITAL | Age: 20
End: 2025-08-18
Payer: COMMERCIAL

## 2025-08-18 ENCOUNTER — APPOINTMENT (OUTPATIENT)
Dept: CT IMAGING | Facility: HOSPITAL | Age: 20
End: 2025-08-18
Payer: COMMERCIAL

## 2025-08-18 ENCOUNTER — HOSPITAL ENCOUNTER (EMERGENCY)
Facility: HOSPITAL | Age: 20
Discharge: HOME | End: 2025-08-18
Attending: EMERGENCY MEDICINE | Admitting: EMERGENCY MEDICINE
Payer: COMMERCIAL

## 2025-08-18 VITALS
SYSTOLIC BLOOD PRESSURE: 116 MMHG | DIASTOLIC BLOOD PRESSURE: 94 MMHG | BODY MASS INDEX: 26.18 KG/M2 | RESPIRATION RATE: 18 BRPM | HEART RATE: 76 BPM | HEIGHT: 66 IN | WEIGHT: 162.92 LBS | OXYGEN SATURATION: 100 % | TEMPERATURE: 98 F

## 2025-08-18 DIAGNOSIS — R10.9 ABDOMINAL PAIN, UNSPECIFIED ABDOMINAL LOCATION: Primary | ICD-10-CM

## 2025-08-18 DIAGNOSIS — R11.2 NAUSEA AND VOMITING, UNSPECIFIED VOMITING TYPE: ICD-10-CM

## 2025-08-18 LAB
ALBUMIN SERPL-MCNC: 4.9 G/DL (ref 3.5–5.2)
ALBUMIN/GLOB SERPL: 1.5 G/DL
ALP SERPL-CCNC: 52 U/L (ref 39–117)
ALT SERPL W P-5'-P-CCNC: 20 U/L (ref 1–33)
ANION GAP SERPL CALCULATED.3IONS-SCNC: 21.4 MMOL/L (ref 5–15)
AST SERPL-CCNC: 29 U/L (ref 1–32)
BACTERIA UR QL AUTO: ABNORMAL /HPF
BASOPHILS # BLD AUTO: 0.04 10*3/MM3 (ref 0–0.2)
BASOPHILS NFR BLD AUTO: 0.4 % (ref 0–1.5)
BILIRUB SERPL-MCNC: 0.9 MG/DL (ref 0–1.2)
BILIRUB UR QL STRIP: NEGATIVE
BUN SERPL-MCNC: 11.4 MG/DL (ref 6–20)
BUN/CREAT SERPL: 12.5 (ref 7–25)
CALCIUM SPEC-SCNC: 10.3 MG/DL (ref 8.6–10.5)
CHLORIDE SERPL-SCNC: 99 MMOL/L (ref 98–107)
CLARITY UR: CLEAR
CO2 SERPL-SCNC: 15.6 MMOL/L (ref 22–29)
COLOR UR: YELLOW
CREAT SERPL-MCNC: 0.91 MG/DL (ref 0.57–1)
DEPRECATED RDW RBC AUTO: 36.2 FL (ref 37–54)
EGFRCR SERPLBLD CKD-EPI 2021: 92.8 ML/MIN/1.73
EOSINOPHIL # BLD AUTO: 0.01 10*3/MM3 (ref 0–0.4)
EOSINOPHIL NFR BLD AUTO: 0.1 % (ref 0.3–6.2)
ERYTHROCYTE [DISTWIDTH] IN BLOOD BY AUTOMATED COUNT: 12.4 % (ref 12.3–15.4)
GLOBULIN UR ELPH-MCNC: 3.3 GM/DL
GLUCOSE BLDC GLUCOMTR-MCNC: 120 MG/DL (ref 70–99)
GLUCOSE SERPL-MCNC: 140 MG/DL (ref 65–99)
GLUCOSE UR STRIP-MCNC: NEGATIVE MG/DL
HCG SERPL QL: NEGATIVE
HCT VFR BLD AUTO: 42.4 % (ref 34–46.6)
HGB BLD-MCNC: 14.3 G/DL (ref 12–15.9)
HGB UR QL STRIP.AUTO: NEGATIVE
HYALINE CASTS UR QL AUTO: ABNORMAL /LPF
IMM GRANULOCYTES # BLD AUTO: 0.05 10*3/MM3 (ref 0–0.05)
IMM GRANULOCYTES NFR BLD AUTO: 0.5 % (ref 0–0.5)
KETONES UR QL STRIP: ABNORMAL
LEUKOCYTE ESTERASE UR QL STRIP.AUTO: NEGATIVE
LIPASE SERPL-CCNC: 24 U/L (ref 13–60)
LYMPHOCYTES # BLD AUTO: 1.09 10*3/MM3 (ref 0.7–3.1)
LYMPHOCYTES NFR BLD AUTO: 10.7 % (ref 19.6–45.3)
MCH RBC QN AUTO: 27.1 PG (ref 26.6–33)
MCHC RBC AUTO-ENTMCNC: 33.7 G/DL (ref 31.5–35.7)
MCV RBC AUTO: 80.5 FL (ref 79–97)
MONOCYTES # BLD AUTO: 0.67 10*3/MM3 (ref 0.1–0.9)
MONOCYTES NFR BLD AUTO: 6.6 % (ref 5–12)
MUCOUS THREADS URNS QL MICRO: ABNORMAL /HPF
NEUTROPHILS NFR BLD AUTO: 8.28 10*3/MM3 (ref 1.7–7)
NEUTROPHILS NFR BLD AUTO: 81.7 % (ref 42.7–76)
NITRITE UR QL STRIP: NEGATIVE
NRBC BLD AUTO-RTO: 0 /100 WBC (ref 0–0.2)
NT-PROBNP SERPL-MCNC: <36 PG/ML (ref 0–450)
PH UR STRIP.AUTO: 6 [PH] (ref 5–8)
PLATELET # BLD AUTO: 338 10*3/MM3 (ref 140–450)
PMV BLD AUTO: 11.7 FL (ref 6–12)
POTASSIUM SERPL-SCNC: 3.6 MMOL/L (ref 3.5–5.2)
PROT SERPL-MCNC: 8.2 G/DL (ref 6–8.5)
PROT UR QL STRIP: ABNORMAL
RBC # BLD AUTO: 5.27 10*6/MM3 (ref 3.77–5.28)
RBC # UR STRIP: ABNORMAL /HPF
REF LAB TEST METHOD: ABNORMAL
SODIUM SERPL-SCNC: 136 MMOL/L (ref 136–145)
SP GR UR STRIP: >1.03 (ref 1–1.03)
SQUAMOUS #/AREA URNS HPF: ABNORMAL /HPF
TROPONIN T SERPL HS-MCNC: <6 NG/L
UROBILINOGEN UR QL STRIP: ABNORMAL
WBC # UR STRIP: ABNORMAL /HPF
WBC NRBC COR # BLD AUTO: 10.14 10*3/MM3 (ref 3.4–10.8)

## 2025-08-18 PROCEDURE — 93005 ELECTROCARDIOGRAM TRACING: CPT | Performed by: EMERGENCY MEDICINE

## 2025-08-18 PROCEDURE — 25510000001 IOPAMIDOL PER 1 ML: Performed by: EMERGENCY MEDICINE

## 2025-08-18 PROCEDURE — 83880 ASSAY OF NATRIURETIC PEPTIDE: CPT | Performed by: EMERGENCY MEDICINE

## 2025-08-18 PROCEDURE — 85025 COMPLETE CBC W/AUTO DIFF WBC: CPT | Performed by: EMERGENCY MEDICINE

## 2025-08-18 PROCEDURE — 99285 EMERGENCY DEPT VISIT HI MDM: CPT

## 2025-08-18 PROCEDURE — 25010000002 ONDANSETRON PER 1 MG: Performed by: EMERGENCY MEDICINE

## 2025-08-18 PROCEDURE — 80053 COMPREHEN METABOLIC PANEL: CPT | Performed by: EMERGENCY MEDICINE

## 2025-08-18 PROCEDURE — 81001 URINALYSIS AUTO W/SCOPE: CPT | Performed by: EMERGENCY MEDICINE

## 2025-08-18 PROCEDURE — 74177 CT ABD & PELVIS W/CONTRAST: CPT

## 2025-08-18 PROCEDURE — 96375 TX/PRO/DX INJ NEW DRUG ADDON: CPT

## 2025-08-18 PROCEDURE — 96374 THER/PROPH/DIAG INJ IV PUSH: CPT

## 2025-08-18 PROCEDURE — 84703 CHORIONIC GONADOTROPIN ASSAY: CPT | Performed by: EMERGENCY MEDICINE

## 2025-08-18 PROCEDURE — 84484 ASSAY OF TROPONIN QUANT: CPT | Performed by: EMERGENCY MEDICINE

## 2025-08-18 PROCEDURE — 71045 X-RAY EXAM CHEST 1 VIEW: CPT

## 2025-08-18 PROCEDURE — 25010000002 MORPHINE PER 10 MG: Performed by: EMERGENCY MEDICINE

## 2025-08-18 PROCEDURE — 25010000002 DROPERIDOL PER 5 MG: Performed by: EMERGENCY MEDICINE

## 2025-08-18 PROCEDURE — 25810000003 SODIUM CHLORIDE 0.9 % SOLUTION: Performed by: EMERGENCY MEDICINE

## 2025-08-18 PROCEDURE — 82948 REAGENT STRIP/BLOOD GLUCOSE: CPT

## 2025-08-18 PROCEDURE — 96361 HYDRATE IV INFUSION ADD-ON: CPT

## 2025-08-18 PROCEDURE — 83690 ASSAY OF LIPASE: CPT | Performed by: EMERGENCY MEDICINE

## 2025-08-18 RX ORDER — DROPERIDOL 2.5 MG/ML
1.25 INJECTION, SOLUTION INTRAMUSCULAR; INTRAVENOUS ONCE
Status: COMPLETED | OUTPATIENT
Start: 2025-08-18 | End: 2025-08-18

## 2025-08-18 RX ORDER — ONDANSETRON 4 MG/1
4 TABLET, ORALLY DISINTEGRATING ORAL 4 TIMES DAILY PRN
Qty: 20 TABLET | Refills: 0 | Status: SHIPPED | OUTPATIENT
Start: 2025-08-18

## 2025-08-18 RX ORDER — IOPAMIDOL 755 MG/ML
100 INJECTION, SOLUTION INTRAVASCULAR
Status: COMPLETED | OUTPATIENT
Start: 2025-08-18 | End: 2025-08-18

## 2025-08-18 RX ORDER — PROMETHAZINE HYDROCHLORIDE 25 MG/1
25 SUPPOSITORY RECTAL EVERY 6 HOURS PRN
Qty: 10 SUPPOSITORY | Refills: 0 | Status: SHIPPED | OUTPATIENT
Start: 2025-08-18

## 2025-08-18 RX ORDER — ONDANSETRON 2 MG/ML
4 INJECTION INTRAMUSCULAR; INTRAVENOUS ONCE
Status: COMPLETED | OUTPATIENT
Start: 2025-08-18 | End: 2025-08-18

## 2025-08-18 RX ADMIN — SODIUM CHLORIDE 1000 ML: 9 INJECTION, SOLUTION INTRAVENOUS at 09:30

## 2025-08-18 RX ADMIN — ONDANSETRON 4 MG: 2 INJECTION INTRAMUSCULAR; INTRAVENOUS at 09:28

## 2025-08-18 RX ADMIN — IOPAMIDOL 100 ML: 755 INJECTION, SOLUTION INTRAVENOUS at 10:09

## 2025-08-18 RX ADMIN — MORPHINE SULFATE 4 MG: 4 INJECTION, SOLUTION INTRAMUSCULAR; INTRAVENOUS at 09:30

## 2025-08-18 RX ADMIN — DROPERIDOL 1.25 MG: 2.5 INJECTION, SOLUTION INTRAMUSCULAR; INTRAVENOUS at 10:49

## 2025-08-19 LAB
QT INTERVAL: 417 MS
QTC INTERVAL: 476 MS

## 2025-08-20 ENCOUNTER — HOSPITAL ENCOUNTER (EMERGENCY)
Facility: HOSPITAL | Age: 20
Discharge: HOME | End: 2025-08-20
Attending: EMERGENCY MEDICINE
Payer: COMMERCIAL

## 2025-08-22 ENCOUNTER — OFFICE VISIT (OUTPATIENT)
Dept: INTERNAL MEDICINE | Facility: CLINIC | Age: 20
End: 2025-08-22
Payer: COMMERCIAL

## 2025-08-22 VITALS
OXYGEN SATURATION: 97 % | WEIGHT: 157.4 LBS | SYSTOLIC BLOOD PRESSURE: 110 MMHG | DIASTOLIC BLOOD PRESSURE: 70 MMHG | BODY MASS INDEX: 25.3 KG/M2 | HEIGHT: 66 IN | HEART RATE: 83 BPM | TEMPERATURE: 97.3 F

## 2025-08-22 DIAGNOSIS — Z11.3 SCREENING EXAMINATION FOR STI: ICD-10-CM

## 2025-08-22 DIAGNOSIS — R10.9 ABDOMINAL CRAMPING: ICD-10-CM

## 2025-08-22 DIAGNOSIS — R11.2 NAUSEA AND VOMITING, UNSPECIFIED VOMITING TYPE: ICD-10-CM

## 2025-08-22 DIAGNOSIS — R19.7 DIARRHEA, UNSPECIFIED TYPE: ICD-10-CM

## 2025-08-22 DIAGNOSIS — R10.84 GENERALIZED ABDOMINAL PAIN: Primary | ICD-10-CM

## 2025-08-22 DIAGNOSIS — F41.1 GAD (GENERALIZED ANXIETY DISORDER): ICD-10-CM

## 2025-08-22 LAB
HAV IGM SERPL QL IA: NORMAL
HBV CORE IGM SERPL QL IA: NORMAL
HBV SURFACE AG SERPL QL IA: NORMAL
HCV AB SER QL: NORMAL
HIV 1+2 AB+HIV1 P24 AG SERPL QL IA: NORMAL

## 2025-08-22 PROCEDURE — 99214 OFFICE O/P EST MOD 30 MIN: CPT | Performed by: NURSE PRACTITIONER

## 2025-08-22 RX ORDER — BUSPIRONE HYDROCHLORIDE 5 MG/1
5 TABLET ORAL 3 TIMES DAILY
Qty: 90 TABLET | Refills: 1 | Status: SHIPPED | OUTPATIENT
Start: 2025-08-22

## 2025-08-22 RX ORDER — DICYCLOMINE HYDROCHLORIDE 10 MG/1
10 CAPSULE ORAL
Qty: 60 CAPSULE | Refills: 0 | Status: SHIPPED | OUTPATIENT
Start: 2025-08-22

## 2025-08-22 RX ORDER — METOCLOPRAMIDE 5 MG/1
5 TABLET ORAL 4 TIMES DAILY
COMMUNITY
Start: 2025-08-20 | End: 2025-08-25

## 2025-08-23 LAB — RPR SER QL: NORMAL

## 2025-08-27 ENCOUNTER — RESULTS FOLLOW-UP (OUTPATIENT)
Dept: INTERNAL MEDICINE | Facility: CLINIC | Age: 20
End: 2025-08-27
Payer: COMMERCIAL

## 2025-08-27 DIAGNOSIS — T78.40XA ALLERGIC REACTION, INITIAL ENCOUNTER: Primary | ICD-10-CM

## 2025-08-28 LAB
ALPHA-GAL IGE QN: <0.1 KU/L
BEEF IGE QN: <0.1 KU/L
CLAM IGE QN: 0.14 KU/L
CODFISH IGE QN: <0.1 KU/L
CORN IGE QN: 0.6 KU/L
COW MILK IGE QN: <0.1 KU/L
EGG WHITE IGE QN: <0.1 KU/L
IGE SERPL-ACNC: 504 IU/ML (ref 6–495)
LAMB IGE QN: <0.1 KU/L
PEANUT IGE QN: 6.95 KU/L
PORK IGE QN: <0.1 KU/L
SCALLOP IGE QN: <0.1 KU/L
SERVICE CMNT-IMP: ABNORMAL
SERVICE CMNT-IMP: ABNORMAL
SESAME SEED IGE QN: 1 KU/L
SHRIMP IGE QN: <0.1 KU/L
SOYBEAN IGE QN: 0.21 KU/L
WALNUT IGE QN: 0.23 KU/L
WHEAT IGE QN: 1.03 KU/L

## 2025-08-28 RX ORDER — EPINEPHRINE 0.3 MG/.3ML
0.3 INJECTION SUBCUTANEOUS ONCE
Qty: 1 EACH | Refills: 0 | Status: SHIPPED | OUTPATIENT
Start: 2025-08-28 | End: 2025-08-28